# Patient Record
Sex: MALE | URBAN - METROPOLITAN AREA
[De-identification: names, ages, dates, MRNs, and addresses within clinical notes are randomized per-mention and may not be internally consistent; named-entity substitution may affect disease eponyms.]

---

## 2017-03-10 ENCOUNTER — APPOINTMENT (RX ONLY)
Dept: URBAN - METROPOLITAN AREA CLINIC 361 | Facility: CLINIC | Age: 72
Setting detail: DERMATOLOGY
End: 2017-03-10

## 2017-03-10 DIAGNOSIS — Z86.007 PERSONAL HISTORY OF IN-SITU NEOPLASM OF SKIN: ICD-10-CM

## 2017-03-10 DIAGNOSIS — Z85.828 PERSONAL HISTORY OF OTHER MALIGNANT NEOPLASM OF SKIN: ICD-10-CM

## 2017-03-10 DIAGNOSIS — L57.0 ACTINIC KERATOSIS: ICD-10-CM

## 2017-03-10 DIAGNOSIS — Z71.89 OTHER SPECIFIED COUNSELING: ICD-10-CM

## 2017-03-10 DIAGNOSIS — D485 NEOPLASM OF UNCERTAIN BEHAVIOR OF SKIN: ICD-10-CM

## 2017-03-10 DIAGNOSIS — R52 PAIN, UNSPECIFIED: ICD-10-CM

## 2017-03-10 DIAGNOSIS — L82.0 INFLAMED SEBORRHEIC KERATOSIS: ICD-10-CM

## 2017-03-10 PROBLEM — D48.5 NEOPLASM OF UNCERTAIN BEHAVIOR OF SKIN: Status: ACTIVE | Noted: 2017-03-10

## 2017-03-10 PROCEDURE — ? BENIGN DESTRUCTION

## 2017-03-10 PROCEDURE — 99214 OFFICE O/P EST MOD 30 MIN: CPT | Mod: 25

## 2017-03-10 PROCEDURE — 17000 DESTRUCT PREMALG LESION: CPT | Mod: 59

## 2017-03-10 PROCEDURE — ? LIQUID NITROGEN

## 2017-03-10 PROCEDURE — ? BIOPSY BY SHAVE METHOD

## 2017-03-10 PROCEDURE — ? BIOPSY BY PUNCH METHOD

## 2017-03-10 PROCEDURE — ? COUNSELING

## 2017-03-10 PROCEDURE — 17003 DESTRUCT PREMALG LES 2-14: CPT

## 2017-03-10 PROCEDURE — 11100: CPT

## 2017-03-10 PROCEDURE — 69100 BIOPSY OF EXTERNAL EAR: CPT | Mod: 59

## 2017-03-10 PROCEDURE — ? OTHER

## 2017-03-10 ASSESSMENT — LOCATION ZONE DERM
LOCATION ZONE: EAR
LOCATION ZONE: ARM
LOCATION ZONE: LEG
LOCATION ZONE: SCALP
LOCATION ZONE: TRUNK
LOCATION ZONE: FACE

## 2017-03-10 ASSESSMENT — LOCATION SIMPLE DESCRIPTION DERM
LOCATION SIMPLE: RIGHT SCALP
LOCATION SIMPLE: LEFT CHEEK
LOCATION SIMPLE: SCALP
LOCATION SIMPLE: LEFT UPPER ARM
LOCATION SIMPLE: CHEST
LOCATION SIMPLE: RIGHT EAR
LOCATION SIMPLE: ABDOMEN
LOCATION SIMPLE: ANTERIOR SCALP
LOCATION SIMPLE: LEFT SCALP
LOCATION SIMPLE: LEFT PRETIBIAL REGION

## 2017-03-10 ASSESSMENT — LOCATION DETAILED DESCRIPTION DERM
LOCATION DETAILED: LEFT DISTAL PRETIBIAL REGION
LOCATION DETAILED: XIPHOID
LOCATION DETAILED: RIGHT MEDIAL FRONTAL SCALP
LOCATION DETAILED: RIGHT SUPERIOR CRUS OF ANTIHELIX
LOCATION DETAILED: LEFT ANTERIOR PROXIMAL UPPER ARM
LOCATION DETAILED: LEFT PROXIMAL PRETIBIAL REGION
LOCATION DETAILED: RIGHT MEDIAL SUPERIOR CHEST
LOCATION DETAILED: RIGHT SUPERIOR PARIETAL SCALP
LOCATION DETAILED: LEFT SUPERIOR PARIETAL SCALP
LOCATION DETAILED: LEFT LATERAL MANDIBULAR CHEEK
LOCATION DETAILED: RIGHT INFERIOR FRONTAL SCALP
LOCATION DETAILED: MID-FRONTAL SCALP
LOCATION DETAILED: RIGHT CAVUM CONCHA
LOCATION DETAILED: LEFT CENTRAL FRONTAL SCALP
LOCATION DETAILED: LEFT CENTRAL MALAR CHEEK

## 2017-03-10 ASSESSMENT — PAIN INTENSITY VAS: HOW INTENSE IS YOUR PAIN 0 BEING NO PAIN, 10 BEING THE MOST SEVERE PAIN POSSIBLE?: NO PAIN

## 2017-03-10 NOTE — PROCEDURE: LIQUID NITROGEN
Duration Of Freeze Thaw-Cycle (Seconds): 20
Consent: The patient's consent was obtained including but not limited to risks of crusting, scabbing, blistering, scarring, darker or lighter pigmentary change, recurrence, incomplete removal and infection.
Number Of Freeze-Thaw Cycles: 1 freeze-thaw cycle
Render Post-Care Instructions In Note?: no
Detail Level: Simple
Detail Level: Detailed

## 2017-03-10 NOTE — HPI: SKIN CANCER EXAM
What Is The Reason For Today's Visit?: Skin Cancer Screening Exam
What Is The Reason For Today's Visit? (Being Monitored For X): the re-examination of lesions previously examined
Additional History: Pt denies recurrences.

## 2017-03-10 NOTE — PROCEDURE: BENIGN DESTRUCTION
Medical Necessity Information: It is in your best interest to select a reason for this procedure from the list below. All of these items fulfill various CMS LCD requirements except the new and changing color options.
Anesthesia Volume In Cc: 0
Render Post-Care Instructions In Note?: no
Post-Care Instructions: local wound care reviewed.
Consent: The patient's consent was obtained including but not limited to risks of crusting, scabbing, blistering, scarring, darker or lighter pigmentary change, recurrence, incomplete removal and infection.
Medical Necessity Clause: This procedure was medically necessary because the lesions that were treated were:
Detail Level: Detailed

## 2017-03-10 NOTE — PROCEDURE: BIOPSY BY PUNCH METHOD
Billing Type: Patient Bill
Punch Size In Mm: 2
Bill [id number] For Specimen Handling/Conveyance To Laboratory?: no
Post-Care Instructions: local wound care instructions written were given.
Biopsy Type: H and E
Dressing: bandage
X Size Of Lesion In Cm (Optional): 0
Wound Care: Aquaphor
Hemostasis: None
Epidermal Sutures: none, closed by secondary intention
Anesthesia Volume In Cc (Will Not Render If 0): 0.2
Detail Level: Detailed
Consent: Risks of scarring, infection, and bleeding.
Anesthesia Type: 1% lidocaine with epinephrine

## 2017-03-10 NOTE — PROCEDURE: OTHER
Note Text (......Xxx Chief Complaint.): This diagnosis correlates with the
Other (Free Text): Pt states present for years but reports a recent change.
Other (Free Text): Waxy adherent scale was removed manually before lesions on the scalp were treated today
Detail Level: Detailed
Other (Free Text): Pt reports a recent tenderness in this area.

## 2017-03-10 NOTE — PROCEDURE: BIOPSY BY SHAVE METHOD
Additional Anesthesia Volume In Cc (Will Not Render If 0): 0
Anesthesia Volume In Cc (Will Not Render If 0): 0.2
Wound Care: Aquaphor
Bill [id number] For Specimen Handling/Conveyance To Laboratory?: no
Dressing: bandage
Anesthesia Type: 1% lidocaine with epinephrine
Cryotherapy Text: The wound bed was treated with cryotherapy after the biopsy was performed.
Billing Type: Third-Party Bill
Detail Level: Detailed
Biopsy Type: H and E
Post-Care Instructions: I reviewed with the patient in detail post-care instructions. Patient is to keep the biopsy site dry overnight, and then apply aquaphor twice daily until healed. Patient may apply soap and water to clean area after 24 hours.
Electrodesiccation And Curettage Text: The wound bed was treated with electrodesiccation and curettage after the biopsy was performed.
Consent: Verbal consent was obtained and risks were reviewed including but not limited to scarring, infection, bleeding, scabbing, incomplete removal, nerve damage and allergy to anesthesia.
Notification Instructions: Patient will be notified of biopsy results. However, patient instructed to call the office if not contacted within 2 weeks.
Hemostasis: Aluminum Chloride
Biopsy Method: Dermablade
Electrodesiccation Text: The wound bed was treated with electrodesiccation after the biopsy was performed.
Type Of Destruction Used: Curettage
Silver Nitrate Text: The wound bed was treated with silver nitrate after the biopsy was performed.

## 2017-04-21 ENCOUNTER — APPOINTMENT (RX ONLY)
Dept: URBAN - METROPOLITAN AREA CLINIC 361 | Facility: CLINIC | Age: 72
Setting detail: DERMATOLOGY
End: 2017-04-21

## 2017-04-21 DIAGNOSIS — L82.1 OTHER SEBORRHEIC KERATOSIS: ICD-10-CM

## 2017-04-21 DIAGNOSIS — D485 NEOPLASM OF UNCERTAIN BEHAVIOR OF SKIN: ICD-10-CM

## 2017-04-21 DIAGNOSIS — R52 PAIN, UNSPECIFIED: ICD-10-CM

## 2017-04-21 DIAGNOSIS — L57.0 ACTINIC KERATOSIS: ICD-10-CM

## 2017-04-21 PROBLEM — Z85.828 PERSONAL HISTORY OF OTHER MALIGNANT NEOPLASM OF SKIN: Status: ACTIVE | Noted: 2017-04-21

## 2017-04-21 PROBLEM — D48.5 NEOPLASM OF UNCERTAIN BEHAVIOR OF SKIN: Status: ACTIVE | Noted: 2017-04-21

## 2017-04-21 PROBLEM — I10 ESSENTIAL (PRIMARY) HYPERTENSION: Status: ACTIVE | Noted: 2017-04-21

## 2017-04-21 PROCEDURE — ? COUNSELING

## 2017-04-21 PROCEDURE — ? LIQUID NITROGEN

## 2017-04-21 PROCEDURE — 17003 DESTRUCT PREMALG LES 2-14: CPT

## 2017-04-21 PROCEDURE — 17000 DESTRUCT PREMALG LESION: CPT

## 2017-04-21 PROCEDURE — ? OTHER

## 2017-04-21 ASSESSMENT — LOCATION ZONE DERM
LOCATION ZONE: SCALP
LOCATION ZONE: EAR
LOCATION ZONE: FACE

## 2017-04-21 ASSESSMENT — PAIN INTENSITY VAS: HOW INTENSE IS YOUR PAIN 0 BEING NO PAIN, 10 BEING THE MOST SEVERE PAIN POSSIBLE?: NO PAIN

## 2017-04-21 ASSESSMENT — LOCATION DETAILED DESCRIPTION DERM
LOCATION DETAILED: RIGHT SUPERIOR PARIETAL SCALP
LOCATION DETAILED: LEFT SUPERIOR FOREHEAD
LOCATION DETAILED: POSTERIOR MID-PARIETAL SCALP
LOCATION DETAILED: LEFT SUPERIOR PARIETAL SCALP
LOCATION DETAILED: RIGHT MEDIAL FRONTAL SCALP
LOCATION DETAILED: RIGHT CRUS OF HELIX
LOCATION DETAILED: RIGHT SUPERIOR FOREHEAD
LOCATION DETAILED: RIGHT CENTRAL FRONTAL SCALP
LOCATION DETAILED: LEFT CENTRAL FRONTAL SCALP

## 2017-04-21 ASSESSMENT — LOCATION SIMPLE DESCRIPTION DERM
LOCATION SIMPLE: RIGHT EAR
LOCATION SIMPLE: RIGHT FOREHEAD
LOCATION SIMPLE: LEFT SCALP
LOCATION SIMPLE: SCALP
LOCATION SIMPLE: LEFT FOREHEAD
LOCATION SIMPLE: POSTERIOR SCALP
LOCATION SIMPLE: RIGHT SCALP

## 2017-04-21 NOTE — PROCEDURE: OTHER
Detail Level: Detailed
Note Text (......Xxx Chief Complaint.): This diagnosis correlates with the
Other (Free Text): DDX discussed. Previous treatments do not appear to be controlling the lesions. Consider biopsy on persisting lesions. Discussed treating the scalp with topical Efudex in the fall.

## 2017-04-21 NOTE — PROCEDURE: LIQUID NITROGEN
Detail Level: Simple
Detail Level: Detailed
Render Post-Care Instructions In Note?: no
Consent: The patient's consent was obtained including but not limited to risks of crusting, scabbing, blistering, scarring, darker or lighter pigmentary change, recurrence, incomplete removal and infection.
Duration Of Freeze Thaw-Cycle (Seconds): 20
Number Of Freeze-Thaw Cycles: 1 freeze-thaw cycle

## 2017-10-17 ENCOUNTER — RX ONLY (OUTPATIENT)
Age: 72
Setting detail: RX ONLY
End: 2017-10-17

## 2017-10-17 ENCOUNTER — APPOINTMENT (RX ONLY)
Dept: URBAN - METROPOLITAN AREA CLINIC 361 | Facility: CLINIC | Age: 72
Setting detail: DERMATOLOGY
End: 2017-10-17

## 2017-10-17 DIAGNOSIS — D22 MELANOCYTIC NEVI: ICD-10-CM

## 2017-10-17 DIAGNOSIS — Z71.89 OTHER SPECIFIED COUNSELING: ICD-10-CM

## 2017-10-17 DIAGNOSIS — L57.0 ACTINIC KERATOSIS: ICD-10-CM

## 2017-10-17 DIAGNOSIS — D485 NEOPLASM OF UNCERTAIN BEHAVIOR OF SKIN: ICD-10-CM

## 2017-10-17 DIAGNOSIS — Z86.007 PERSONAL HISTORY OF IN-SITU NEOPLASM OF SKIN: ICD-10-CM

## 2017-10-17 DIAGNOSIS — Z85.828 PERSONAL HISTORY OF OTHER MALIGNANT NEOPLASM OF SKIN: ICD-10-CM

## 2017-10-17 PROBLEM — D22.72 MELANOCYTIC NEVI OF LEFT LOWER LIMB, INCLUDING HIP: Status: ACTIVE | Noted: 2017-10-17

## 2017-10-17 PROBLEM — I10 ESSENTIAL (PRIMARY) HYPERTENSION: Status: ACTIVE | Noted: 2017-10-17

## 2017-10-17 PROBLEM — D48.5 NEOPLASM OF UNCERTAIN BEHAVIOR OF SKIN: Status: ACTIVE | Noted: 2017-10-17

## 2017-10-17 PROBLEM — L55.1 SUNBURN OF SECOND DEGREE: Status: ACTIVE | Noted: 2017-10-17

## 2017-10-17 PROCEDURE — ? COUNSELING

## 2017-10-17 PROCEDURE — 99213 OFFICE O/P EST LOW 20 MIN: CPT | Mod: 25

## 2017-10-17 PROCEDURE — ? TREATMENT REGIMEN

## 2017-10-17 PROCEDURE — 11100: CPT | Mod: 59

## 2017-10-17 PROCEDURE — ? OTHER

## 2017-10-17 PROCEDURE — 11101: CPT

## 2017-10-17 PROCEDURE — 17003 DESTRUCT PREMALG LES 2-14: CPT

## 2017-10-17 PROCEDURE — 17000 DESTRUCT PREMALG LESION: CPT

## 2017-10-17 PROCEDURE — ? LIQUID NITROGEN

## 2017-10-17 PROCEDURE — ? BIOPSY BY SHAVE METHOD

## 2017-10-17 PROCEDURE — ? PRESCRIPTION

## 2017-10-17 RX ORDER — FLUOROURACIL 50 MG/ML
SOLUTION TOPICAL
Qty: 1 | Refills: 0 | Status: ERX

## 2017-10-17 ASSESSMENT — LOCATION ZONE DERM
LOCATION ZONE: SCALP
LOCATION ZONE: TOE
LOCATION ZONE: LEG
LOCATION ZONE: FACE
LOCATION ZONE: EAR
LOCATION ZONE: TRUNK

## 2017-10-17 ASSESSMENT — LOCATION SIMPLE DESCRIPTION DERM
LOCATION SIMPLE: UPPER BACK
LOCATION SIMPLE: LEFT FOREHEAD
LOCATION SIMPLE: ABDOMEN
LOCATION SIMPLE: SCALP
LOCATION SIMPLE: LEFT SCALP
LOCATION SIMPLE: RIGHT SCALP
LOCATION SIMPLE: RIGHT UPPER BACK
LOCATION SIMPLE: RIGHT EAR
LOCATION SIMPLE: LEFT PRETIBIAL REGION
LOCATION SIMPLE: LEFT 2ND TOE
LOCATION SIMPLE: LEFT CHEEK

## 2017-10-17 ASSESSMENT — LOCATION DETAILED DESCRIPTION DERM
LOCATION DETAILED: LEFT SUPERIOR FOREHEAD
LOCATION DETAILED: LEFT MEDIAL FRONTAL SCALP
LOCATION DETAILED: RIGHT CAVUM CONCHA
LOCATION DETAILED: RIGHT MEDIAL UPPER BACK
LOCATION DETAILED: LEFT INFERIOR CENTRAL MALAR CHEEK
LOCATION DETAILED: INFERIOR THORACIC SPINE
LOCATION DETAILED: RIGHT CENTRAL FRONTAL SCALP
LOCATION DETAILED: XIPHOID
LOCATION DETAILED: LEFT DORSAL 2ND TOE
LOCATION DETAILED: LEFT PROXIMAL PRETIBIAL REGION
LOCATION DETAILED: LEFT CENTRAL PARIETAL SCALP
LOCATION DETAILED: RIGHT MEDIAL FRONTAL SCALP
LOCATION DETAILED: RIGHT SUPERIOR HELIX
LOCATION DETAILED: LEFT SUPERIOR PARIETAL SCALP

## 2017-10-17 NOTE — PROCEDURE: OTHER
Detail Level: Zone
Detail Level: Detailed
Note Text (......Xxx Chief Complaint.): This diagnosis correlates with the
Other (Free Text): evidence of change so biopsy done.
Other (Free Text): Trial of LN2 today , Bx if persist.
Other (Free Text): Patient will start with the fluorurocil on the scalp after his foot surgery 11/30/17.

## 2017-10-17 NOTE — PROCEDURE: LIQUID NITROGEN
Add 52 Modifier (Optional): no
Duration Of Freeze Thaw-Cycle (Seconds): 20
Detail Level: Simple
Number Of Freeze-Thaw Cycles: 1 freeze-thaw cycle
Consent: The patient's consent was obtained including but not limited to risks of crusting, scabbing, blistering, scarring, darker or lighter pigmentary change, recurrence, incomplete removal and infection.
Medical Necessity Clause: The lesion was medically necessary and treated because they
Detail Level: Detailed
Medical Necessity Information: It is in your best interest to select a reason for this procedure from the list below. All of these items fulfill various CMS LCD requirements except the new and changing color options.

## 2017-10-17 NOTE — PROCEDURE: MIPS QUALITY
Quality 134: Screening For Clinical Depression And Follow-Up Plan: The patient was screened for depression and the screen was negative and no follow up required
Quality 110: Preventive Care And Screening: Influenza Immunization: Influenza Immunization Administered during Influenza season
Quality 131: Pain Assessment And Follow-Up: Pain assessment using a standardized tool is documented as negative, no follow-up plan required
Detail Level: Detailed
Quality 111:Pneumonia Vaccination Status For Older Adults: Pneumococcal Vaccination Previously Received
Quality 431: Preventive Care And Screening: Unhealthy Alcohol Use - Screening: Patient screened for unhealthy alcohol use using a single question and scores less than 2 times per year
Quality 226: Preventive Care And Screening: Tobacco Use: Screening And Cessation Intervention: Patient screened for tobacco and is an ex-smoker

## 2017-10-17 NOTE — PROCEDURE: BIOPSY BY SHAVE METHOD
Billing Type: Third-Party Bill
Silver Nitrate Text: The wound bed was treated with silver nitrate after the biopsy was performed.
Post-Care Instructions: I reviewed with the patient in detail post-care instructions. Patient is to keep the biopsy site dry overnight, and then apply aquaphor twice daily until healed. Patient may apply soap and water to clean area after 24 hours.
Consent: Verbal consent was obtained and risks were reviewed including but not limited to scarring, infection, bleeding, scabbing, incomplete removal, nerve damage and allergy to anesthesia.
Electrodesiccation And Curettage Text: The wound bed was treated with electrodesiccation and curettage after the biopsy was performed.
Biopsy Method: Dermablade
X Size Of Lesion In Cm: 0
Destruction After The Procedure: No
Notification Instructions: Patient will be notified of biopsy results. However, patient instructed to call the office if not contacted within 2 weeks.
Anesthesia Volume In Cc (Will Not Render If 0): 0.3
Anesthesia Type: 1% lidocaine without epinephrine
Wound Care: Aquaphor
Type Of Destruction Used: Curettage
Hemostasis: Aluminum Chloride
Detail Level: Detailed
Biopsy Type: H and E
Dressing: bandage
Anesthesia Volume In Cc (Will Not Render If 0): 0.2
Electrodesiccation Text: The wound bed was treated with electrodesiccation after the biopsy was performed.
Cryotherapy Text: The wound bed was treated with cryotherapy after the biopsy was performed.

## 2018-04-13 ENCOUNTER — APPOINTMENT (RX ONLY)
Dept: URBAN - METROPOLITAN AREA CLINIC 361 | Facility: CLINIC | Age: 73
Setting detail: DERMATOLOGY
End: 2018-04-13

## 2018-04-13 DIAGNOSIS — Z85.828 PERSONAL HISTORY OF OTHER MALIGNANT NEOPLASM OF SKIN: ICD-10-CM

## 2018-04-13 DIAGNOSIS — Z86.007 PERSONAL HISTORY OF IN-SITU NEOPLASM OF SKIN: ICD-10-CM

## 2018-04-13 DIAGNOSIS — Z71.89 OTHER SPECIFIED COUNSELING: ICD-10-CM

## 2018-04-13 DIAGNOSIS — L82.1 OTHER SEBORRHEIC KERATOSIS: ICD-10-CM

## 2018-04-13 DIAGNOSIS — L57.0 ACTINIC KERATOSIS: ICD-10-CM

## 2018-04-13 PROBLEM — L55.1 SUNBURN OF SECOND DEGREE: Status: ACTIVE | Noted: 2018-04-13

## 2018-04-13 PROCEDURE — ? OTHER

## 2018-04-13 PROCEDURE — 17000 DESTRUCT PREMALG LESION: CPT

## 2018-04-13 PROCEDURE — 99213 OFFICE O/P EST LOW 20 MIN: CPT | Mod: 25

## 2018-04-13 PROCEDURE — ? LIQUID NITROGEN

## 2018-04-13 PROCEDURE — ? LIQUID NITROGEN (COSMETIC)

## 2018-04-13 PROCEDURE — 17003 DESTRUCT PREMALG LES 2-14: CPT

## 2018-04-13 PROCEDURE — ? COUNSELING

## 2018-04-13 ASSESSMENT — LOCATION DETAILED DESCRIPTION DERM
LOCATION DETAILED: LEFT DISTAL PRETIBIAL REGION
LOCATION DETAILED: LEFT PROXIMAL PRETIBIAL REGION
LOCATION DETAILED: RIGHT INFERIOR POSTERIOR HELIX
LOCATION DETAILED: LEFT INFERIOR CENTRAL MALAR CHEEK
LOCATION DETAILED: RIGHT CENTRAL FRONTAL SCALP
LOCATION DETAILED: LEFT SUPERIOR PARIETAL SCALP
LOCATION DETAILED: RIGHT LATERAL SUPERIOR CHEST
LOCATION DETAILED: RIGHT EXTERNAL AUDITORY CANAL
LOCATION DETAILED: RIGHT SUPERIOR MEDIAL MIDBACK
LOCATION DETAILED: RIGHT ANTERIOR SHOULDER

## 2018-04-13 ASSESSMENT — LOCATION SIMPLE DESCRIPTION DERM
LOCATION SIMPLE: CHEST
LOCATION SIMPLE: RIGHT LOWER BACK
LOCATION SIMPLE: LEFT CHEEK
LOCATION SIMPLE: RIGHT SCALP
LOCATION SIMPLE: RIGHT SHOULDER
LOCATION SIMPLE: RIGHT EAR
LOCATION SIMPLE: LEFT PRETIBIAL REGION
LOCATION SIMPLE: SCALP

## 2018-04-13 ASSESSMENT — LOCATION ZONE DERM
LOCATION ZONE: FACE
LOCATION ZONE: ARM
LOCATION ZONE: TRUNK
LOCATION ZONE: EAR
LOCATION ZONE: SCALP
LOCATION ZONE: LEG

## 2018-04-13 NOTE — PROCEDURE: LIQUID NITROGEN
Number Of Freeze-Thaw Cycles: 1 freeze-thaw cycle
Render Post-Care Instructions In Note?: no
Consent: The patient's consent was obtained including but not limited to risks of crusting, scabbing, blistering, scarring, darker or lighter pigmentary change, recurrence, incomplete removal and infection.
Duration Of Freeze Thaw-Cycle (Seconds): 20
Detail Level: Simple

## 2018-04-13 NOTE — PROCEDURE: LIQUID NITROGEN (COSMETIC)
Render Post-Care Instructions In Note?: no
Detail Level: Zone
Price (Use Numbers Only, No Special Characters Or $): No charge
Consent: The patient's consent was obtained including but not limited to risks of crusting, scabbing, blistering, scarring, darker or lighter pigmentary change, recurrence, incomplete removal and infection.
Post-Care Instructions: I reviewed with the patient in detail post-care instructions. Patient is to wear sunprotection, and avoid picking at any of the treated lesions. Pt may apply Vaseline to crusted or scabbing areas.

## 2018-04-13 NOTE — PROCEDURE: MIPS QUALITY
Quality 431: Preventive Care And Screening: Unhealthy Alcohol Use - Screening: Patient screened for unhealthy alcohol use using a single question and scores less than 2 times per year
Detail Level: Detailed
Quality 111:Pneumonia Vaccination Status For Older Adults: Pneumococcal Vaccination Previously Received
Quality 226: Preventive Care And Screening: Tobacco Use: Screening And Cessation Intervention: Patient screened for tobacco and is an ex-smoker
Quality 134: Screening For Clinical Depression And Follow-Up Plan: The patient was screened for depression and the screen was negative and no follow up required
Quality 110: Preventive Care And Screening: Influenza Immunization: Influenza Immunization previously received during influenza season
Quality 131: Pain Assessment And Follow-Up: Pain assessment using a standardized tool is documented as negative, no follow-up plan required

## 2018-04-13 NOTE — PROCEDURE: OTHER
Detail Level: Detailed
Other (Free Text): Biopsy confirmed on the right inner portion of ear.  s/p LN2 ear  with minimal recurrence.  Repeat LN today with consideration of repeat biopsy if persists.
Note Text (......Xxx Chief Complaint.): This diagnosis correlates with the

## 2018-05-31 ENCOUNTER — APPOINTMENT (RX ONLY)
Dept: URBAN - METROPOLITAN AREA CLINIC 361 | Facility: CLINIC | Age: 73
Setting detail: DERMATOLOGY
End: 2018-05-31

## 2018-05-31 DIAGNOSIS — L57.0 ACTINIC KERATOSIS: ICD-10-CM

## 2018-05-31 PROCEDURE — 17000 DESTRUCT PREMALG LESION: CPT

## 2018-05-31 PROCEDURE — ? OTHER

## 2018-05-31 PROCEDURE — ? LIQUID NITROGEN

## 2018-05-31 ASSESSMENT — LOCATION SIMPLE DESCRIPTION DERM: LOCATION SIMPLE: RIGHT EAR

## 2018-05-31 ASSESSMENT — LOCATION DETAILED DESCRIPTION DERM: LOCATION DETAILED: RIGHT CAVUM CONCHA

## 2018-05-31 ASSESSMENT — LOCATION ZONE DERM: LOCATION ZONE: EAR

## 2018-05-31 NOTE — PROCEDURE: MIPS QUALITY
Detail Level: Detailed
Quality 131: Pain Assessment And Follow-Up: Pain assessment using a standardized tool is documented as negative, no follow-up plan required
Quality 226: Preventive Care And Screening: Tobacco Use: Screening And Cessation Intervention: Patient screened for tobacco and is an ex-smoker
Quality 134: Screening For Clinical Depression And Follow-Up Plan: The patient was screened for depression and the screen was negative and no follow up required
Quality 431: Preventive Care And Screening: Unhealthy Alcohol Use - Screening: Patient screened for unhealthy alcohol use using a single question and scores less than 2 times per year

## 2018-05-31 NOTE — PROCEDURE: OTHER
Detail Level: Zone
Other (Free Text): Biopsy confirmed Hypertrophic [place] March 2017.\\nPossible sampling error discussed. \\n\\nOffered to do the following today: LN2 or repeat biopsy ( pt understand this is the safest ). Pt prefers LN2 today. Reevaluate 1 month if persist biopsy again next.
Note Text (......Xxx Chief Complaint.): This diagnosis correlates with the

## 2021-11-19 ENCOUNTER — APPOINTMENT (RX ONLY)
Dept: URBAN - METROPOLITAN AREA CLINIC 361 | Facility: CLINIC | Age: 76
Setting detail: DERMATOLOGY
End: 2021-11-19

## 2021-11-19 DIAGNOSIS — L57.0 ACTINIC KERATOSIS: ICD-10-CM

## 2021-11-19 DIAGNOSIS — I78.8 OTHER DISEASES OF CAPILLARIES: ICD-10-CM

## 2021-11-19 DIAGNOSIS — Z71.89 OTHER SPECIFIED COUNSELING: ICD-10-CM

## 2021-11-19 DIAGNOSIS — L82.1 OTHER SEBORRHEIC KERATOSIS: ICD-10-CM

## 2021-11-19 DIAGNOSIS — I87.2 VENOUS INSUFFICIENCY (CHRONIC) (PERIPHERAL): ICD-10-CM

## 2021-11-19 DIAGNOSIS — Z85.828 PERSONAL HISTORY OF OTHER MALIGNANT NEOPLASM OF SKIN: ICD-10-CM

## 2021-11-19 DIAGNOSIS — D22 MELANOCYTIC NEVI: ICD-10-CM

## 2021-11-19 DIAGNOSIS — D18.0 HEMANGIOMA: ICD-10-CM

## 2021-11-19 DIAGNOSIS — B35.1 TINEA UNGUIUM: ICD-10-CM

## 2021-11-19 DIAGNOSIS — Z86.007 PERSONAL HISTORY OF IN-SITU NEOPLASM OF SKIN: ICD-10-CM

## 2021-11-19 DIAGNOSIS — L72.0 EPIDERMAL CYST: ICD-10-CM

## 2021-11-19 PROBLEM — D18.01 HEMANGIOMA OF SKIN AND SUBCUTANEOUS TISSUE: Status: ACTIVE | Noted: 2021-11-19

## 2021-11-19 PROBLEM — L55.1 SUNBURN OF SECOND DEGREE: Status: ACTIVE | Noted: 2021-11-19

## 2021-11-19 PROBLEM — D22.5 MELANOCYTIC NEVI OF TRUNK: Status: ACTIVE | Noted: 2021-11-19

## 2021-11-19 PROBLEM — I25.10 ATHEROSCLEROTIC HEART DISEASE OF NATIVE CORONARY ARTERY WITHOUT ANGINA PECTORIS: Status: ACTIVE | Noted: 2021-11-19

## 2021-11-19 PROCEDURE — 17003 DESTRUCT PREMALG LES 2-14: CPT

## 2021-11-19 PROCEDURE — 99203 OFFICE O/P NEW LOW 30 MIN: CPT | Mod: 25

## 2021-11-19 PROCEDURE — ? TREATMENT REGIMEN

## 2021-11-19 PROCEDURE — 17000 DESTRUCT PREMALG LESION: CPT

## 2021-11-19 PROCEDURE — ? COUNSELING

## 2021-11-19 PROCEDURE — ? LIQUID NITROGEN

## 2021-11-19 PROCEDURE — ? DEFER

## 2021-11-19 ASSESSMENT — LOCATION DETAILED DESCRIPTION DERM
LOCATION DETAILED: RIGHT PROXIMAL PRETIBIAL REGION
LOCATION DETAILED: LEFT CENTRAL PARIETAL SCALP
LOCATION DETAILED: RIGHT SUPERIOR PARIETAL SCALP
LOCATION DETAILED: LEFT SUPERIOR PARIETAL SCALP
LOCATION DETAILED: LEFT PROXIMAL PRETIBIAL REGION
LOCATION DETAILED: LEFT CENTRAL MALAR CHEEK
LOCATION DETAILED: POSTERIOR MID-PARIETAL SCALP
LOCATION DETAILED: LEFT DISTAL PRETIBIAL REGION
LOCATION DETAILED: RIGHT GREAT TOENAIL
LOCATION DETAILED: RIGHT MEDIAL FRONTAL SCALP
LOCATION DETAILED: LEFT INFERIOR LATERAL UPPER BACK
LOCATION DETAILED: RIGHT INFERIOR POSTERIOR HELIX
LOCATION DETAILED: EPIGASTRIC SKIN
LOCATION DETAILED: LEFT DORSAL GREAT TOE
LOCATION DETAILED: RIGHT CENTRAL MALAR CHEEK
LOCATION DETAILED: LEFT INFERIOR CENTRAL MALAR CHEEK
LOCATION DETAILED: MID-FRONTAL SCALP
LOCATION DETAILED: RIGHT CENTRAL FRONTAL SCALP
LOCATION DETAILED: LEFT SUPERIOR FOREHEAD
LOCATION DETAILED: RIGHT SUPERIOR MEDIAL UPPER BACK
LOCATION DETAILED: SUPERIOR LUMBAR SPINE

## 2021-11-19 ASSESSMENT — LOCATION ZONE DERM
LOCATION ZONE: SCALP
LOCATION ZONE: EAR
LOCATION ZONE: TOENAIL
LOCATION ZONE: TRUNK
LOCATION ZONE: TOE
LOCATION ZONE: FACE
LOCATION ZONE: LEG

## 2021-11-19 ASSESSMENT — LOCATION SIMPLE DESCRIPTION DERM
LOCATION SIMPLE: RIGHT EAR
LOCATION SIMPLE: LEFT GREAT TOE
LOCATION SIMPLE: SCALP
LOCATION SIMPLE: ABDOMEN
LOCATION SIMPLE: RIGHT UPPER BACK
LOCATION SIMPLE: RIGHT CHEEK
LOCATION SIMPLE: LEFT UPPER BACK
LOCATION SIMPLE: LEFT PRETIBIAL REGION
LOCATION SIMPLE: RIGHT SCALP
LOCATION SIMPLE: RIGHT PRETIBIAL REGION
LOCATION SIMPLE: POSTERIOR SCALP
LOCATION SIMPLE: LEFT CHEEK
LOCATION SIMPLE: RIGHT GREAT TOE
LOCATION SIMPLE: ANTERIOR SCALP
LOCATION SIMPLE: LOWER BACK
LOCATION SIMPLE: LEFT FOREHEAD

## 2021-11-19 NOTE — PROCEDURE: MIPS QUALITY
Quality 226: Preventive Care And Screening: Tobacco Use: Screening And Cessation Intervention: Patient screened for tobacco use and is an ex/non-smoker
Detail Level: Detailed
Quality 431: Preventive Care And Screening: Unhealthy Alcohol Use - Screening: Patient not identified as an unhealthy alcohol user when screened for unhealthy alcohol use using a systematic screening method
Quality 47: Advance Care Plan: Advance Care Planning discussed and documented in the medical record; patient did not wish or was not able to name a surrogate decision maker or provide an advance care plan.

## 2021-11-19 NOTE — PROCEDURE: DEFER
Introduction Text (Please End With A Colon): The following procedure was deferred:
Detail Level: Zone
Procedure To Be Performed At Next Visit: Treatment
Other Procedure: removal

## 2021-11-19 NOTE — PROCEDURE: TREATMENT REGIMEN
Detail Level: Zone
Plan: Treatment options discussed- curettage, PDT, LN2, Tazarotene, 5FU in solution, and TCA chemical peels\\nDiscussed waiting on 5FU treatment due to pt currently being evaluated for possible prostate cancer \\nPt stated he would like to wait on PDT treatment until he gets prostate biopsy results back

## 2021-11-19 NOTE — HPI: SKIN LESION
What Type Of Note Output Would You Prefer (Optional)?: Bullet Format
Has Your Skin Lesion Been Treated?: not been treated
Is This A New Presentation, Or A Follow-Up?: Skin Lesion
Additional History: Pt stated lesion is in an area that was previously operated on

## 2021-11-19 NOTE — PROCEDURE: LIQUID NITROGEN
Post-Care Instructions: I reviewed with the patient in detail post-care instructions. Patient is to wear sunprotection, and avoid picking at any of the treated lesions. Pt may apply Vaseline to crusted or scabbing areas.
Render Post-Care Instructions In Note?: no
Show Applicator Variable?: Yes
Consent: The patient's consent was obtained including but not limited to risks of crusting, scabbing, blistering, scarring, darker or lighter pigmentary change, recurrence, incomplete removal and infection.
Number Of Freeze-Thaw Cycles: 2 freeze-thaw cycles
Duration Of Freeze Thaw-Cycle (Seconds): 5
Detail Level: Detailed